# Patient Record
Sex: MALE | NOT HISPANIC OR LATINO | URBAN - METROPOLITAN AREA
[De-identification: names, ages, dates, MRNs, and addresses within clinical notes are randomized per-mention and may not be internally consistent; named-entity substitution may affect disease eponyms.]

---

## 2018-03-07 ENCOUNTER — IMPORTED ENCOUNTER (OUTPATIENT)
Dept: URBAN - METROPOLITAN AREA CLINIC 1 | Facility: CLINIC | Age: 53
End: 2018-03-07

## 2018-03-07 PROBLEM — H52.4: Noted: 2018-03-07

## 2018-03-07 PROCEDURE — 92004 COMPRE OPH EXAM NEW PT 1/>: CPT

## 2018-03-07 PROCEDURE — 92015 DETERMINE REFRACTIVE STATE: CPT

## 2021-03-31 ENCOUNTER — IMPORTED ENCOUNTER (OUTPATIENT)
Dept: URBAN - METROPOLITAN AREA CLINIC 1 | Facility: CLINIC | Age: 56
End: 2021-03-31

## 2021-03-31 PROBLEM — H52.4: Noted: 2021-03-31

## 2021-03-31 PROCEDURE — 92015 DETERMINE REFRACTIVE STATE: CPT

## 2021-03-31 PROCEDURE — 92004 COMPRE OPH EXAM NEW PT 1/>: CPT

## 2022-04-02 ASSESSMENT — VISUAL ACUITY
OD_CC: 20/25
OD_CC: J1
OS_CC: 20/25-2
OD_CC: 20/20
OS_CC: J1
OS_CC: 20/20-2

## 2022-04-02 ASSESSMENT — TONOMETRY
OD_IOP_MMHG: 15
OS_IOP_MMHG: 14
OS_IOP_MMHG: 14
OD_IOP_MMHG: 14

## 2023-12-29 ENCOUNTER — COMPREHENSIVE EXAM (OUTPATIENT)
Dept: URBAN - METROPOLITAN AREA CLINIC 1 | Facility: CLINIC | Age: 58
End: 2023-12-29

## 2023-12-29 DIAGNOSIS — H52.223: ICD-10-CM

## 2023-12-29 DIAGNOSIS — H52.4: ICD-10-CM

## 2023-12-29 DIAGNOSIS — H52.03: ICD-10-CM

## 2023-12-29 DIAGNOSIS — Z01.00: ICD-10-CM

## 2023-12-29 PROCEDURE — 92015 DETERMINE REFRACTIVE STATE: CPT

## 2023-12-29 PROCEDURE — 92014 COMPRE OPH EXAM EST PT 1/>: CPT

## 2023-12-29 ASSESSMENT — VISUAL ACUITY
OD_CC: J1
OD_SC: 20/30
OS_CC: J1
OS_SC: 20/30

## 2023-12-29 ASSESSMENT — KERATOMETRY
OS_K2POWER_DIOPTERS: 44.75
OD_AXISANGLE_DEGREES: 112
OD_K1POWER_DIOPTERS: 44.75
OS_AXISANGLE_DEGREES: 060
OS_AXISANGLE2_DEGREES: 150
OD_K2POWER_DIOPTERS: 45.25
OS_K1POWER_DIOPTERS: 44.50
OD_AXISANGLE2_DEGREES: 22

## 2023-12-29 ASSESSMENT — TONOMETRY
OD_IOP_MMHG: 15
OS_IOP_MMHG: 15

## 2025-04-14 NOTE — PATIENT DISCUSSION
No retinal tears or retinal detachment seen on clinical exam today. Reviewed the signs and symptoms of retinal tear/retinal detachment and the importance of calling for prompt evaluation should there be increasing floaters, new flashing lights, or decreasing peripheral vision in either eye at any time. Observation recommended. PAST MEDICAL HISTORY:  Cellulitis     Chronic back pain     Chronic obstructive asthma     HLD (hyperlipidemia)     HTN (hypertension)     NICM (nonischemic cardiomyopathy)     Rheumatoid arthritis